# Patient Record
Sex: FEMALE | Race: WHITE | Employment: FULL TIME | URBAN - METROPOLITAN AREA
[De-identification: names, ages, dates, MRNs, and addresses within clinical notes are randomized per-mention and may not be internally consistent; named-entity substitution may affect disease eponyms.]

---

## 2017-03-08 ENCOUNTER — OFFICE VISIT (OUTPATIENT)
Dept: HEMATOLOGY | Age: 47
End: 2017-03-08

## 2017-03-08 VITALS
DIASTOLIC BLOOD PRESSURE: 69 MMHG | WEIGHT: 223 LBS | HEART RATE: 70 BPM | RESPIRATION RATE: 16 BRPM | OXYGEN SATURATION: 98 % | HEIGHT: 65 IN | BODY MASS INDEX: 37.15 KG/M2 | SYSTOLIC BLOOD PRESSURE: 127 MMHG | TEMPERATURE: 98.5 F

## 2017-03-08 DIAGNOSIS — R76.8 HCV ANTIBODY POSITIVE: Primary | ICD-10-CM

## 2017-03-08 PROBLEM — Z90.49 S/P CHOLECYSTECTOMY: Status: ACTIVE | Noted: 2017-03-08

## 2017-03-08 NOTE — PROGRESS NOTES
93 Tomás Rodriguez MD, CLYDE Manzo PA-C Jonas Pigg, MD, MD Jeannine Russ NP Kimble Reels, NP        [address], [address] [place]  22.     [phone]     FAX: [address], [address]     [facility], [address]     [phone]     FAX: [phone]         Patient Care Team:  Clari Acharya MD as PCP - Jamal Ambrocio MD (Gastroenterology)      Problem List  Date Reviewed: 3/8/2017          Codes Class Noted    HCV antibody positive ICD-10-CM: R76.8  ICD-9-CM: 795.79  3/8/2017        S/P cholecystectomy ICD-10-CM: Z90.49  ICD-9-CM: V45.79  3/8/2017        Calculus of kidney ICD-10-CM: N20.0  ICD-9-CM: 592.0  1/6/2016        S/P bariatric surgery ICD-10-CM: Z98.84  ICD-9-CM: V45.86  5/28/2014        Intestinal malabsorption ICD-10-CM: K90.9  ICD-9-CM: 579.9  5/14/2014        Mor[facility] ([facility] ICD-10-CM: E66.01  ICD-9-CM: 278.01  3/20/2014        GERD (gastroesophageal reflux disease) ICD-10-CM: K21.9  ICD-9-CM: 530.81  Unknown        Ulcerative colitis ([facility] ICD-10-CM: K51.90  ICD-9-CM: 556.9  Unknown        Hiatal hernia ICD-10-CM: K44.9  ICD-9-CM: 553.3  Unknown        Trauma ICD-10-CM: T14.90  ICD-9-CM: 959.9  Unknown    Overview Signed 1/20/2014 10:13 AM by Barb Cole MD     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 All medical records sent by the referring physicians were reviewed including imaging studies and pathology. The patient is a 51 y.o.   female who was noted to have an elevated serum ALT and tested positive for anti-HCV in 2014. HCV RNA was negative. She was retested for anti-HCV in 1/2017 and was borderline positive. HCV RNA was again undetectable. Risk factors for acquiring HCV are a former  that used IV drugs. She underwent gastric bypass surgery in 2014. A liver biopsy at that time demonstrated NAFL with no inflammation or fibrosis. ,. Since gastric bypass surgery she was lost 130 pounds. There was no history of acute incteric hepatitis at the time of these risk factors. CT scan of the liver was performed in 12/2016. The results of the imaging demonstrated a normal appearing liver. The patient has never received treatment for chronic HCV. The most recent laboratory studies indicate that the liver transaminases are normal, alkaline phosphatase is normal, tests of hepatic synthetic and metabolic function are normal, and the platelet count is normal.      The patient has no symptoms which can be attributed to the liver disorder. The patient has not experienced mild fatigue,     The patient completes all daily activities without any functional limitations. ALLERGIES  Allergies   Allergen Reactions    Aspirin Other (comments)     Pt stated that med \"agrivated ulcerative colitis\"    Ciprofloxacin Other (comments)     Colon bleeding    Codeine Other (comments)     Closes windpipe    Keflex [Cephalexin] Other (comments)    Motrin [Ibuprofen] Other (comments)    Sulfa (Sulfonamide Antibiotics) Other (comments)     Affects colitis    Trazodone Other (comments)     Migraine headaches    Vicodin [Hydrocodone-Acetaminophen] Other (comments)       MEDICATIONS  Current Outpatient Prescriptions   Medication Sig    CYCLOBENZAPRINE HCL (FLEXERIL PO) Take  by mouth.  dextrose-vitamin D3 (TRUEPLUS) 4-400 gram-unit chewable tablet Take 16 g by mouth as needed.  rOPINIRole (REQUIP) 0.5 mg tablet     ketorolac (TORADOL) 10 mg tablet Take  by mouth.     clopidogrel (PLAVIX) 75 mg tablet Take  by mouth daily.  amitriptyline (ELAVIL) 50 mg tablet Take 50 mg by mouth nightly.  montelukast (SINGULAIR) 10 mg tablet Take 10 mg by mouth daily.  levothyroxine (LEVOXYL) 88 mcg tablet Take  by mouth Daily (before breakfast).  mometasone-formoterol (DULERA) 200-5 mcg/actuation HFA inhaler Take 2 Puffs by inhalation two (2) times a day.  levalbuterol tartrate (XOPENEX HFA) 45 mcg/actuation inhaler Take  by inhalation.  omeprazole (PRILOSEC OTC) 20 mg tablet Take 20 mg by mouth daily.  ondansetron hcl (ZOFRAN) 4 mg tablet Take 4 mg by mouth every eight (8) hours as needed for Nausea.  nitrofurantoin, macrocrystal-monohydrate, (MACROBID) 100 mg capsule Take 100 mg by mouth two (2) times a day.  cyanocobalamin (VITAMIN B12) 500 mcg tablet Take 2 tablets by mouth Three (3) times a week.  multivitamins (CHEWABLE MULTI VITAMIN) chew Take 1 Tab by mouth two (2) times a day.  calcium citrate tab tablet Take 1 Tab by mouth daily. Take up to 6 chews every day     No current facility-administered medications for this visit. SYSTEM REVIEW NOT RELATED TO LIVER DISEASE OR REVIEWED ABOVE:  Constitution systems: Negative for fever, chills, weight gain, weight loss. Eyes: Negative for visual changes. ENT: Negative for sore throat, painful swallowing. Respiratory: Negative for cough, hemoptysis, SOB. Cardiology: Negative for chest pain, palpitations. GI:  Negative for constipation or diarrhea. : Negative for urinary frequency, dysuria, hematuria, nocturia. Skin: Negative for rash. Hematology: Negative for easy bruising, blood clots. Musculo-skelatal: Negative for back pain, muscle pain, weakness. Neurologic: Negative for headaches, dizziness, vertigo, memory problems not related to HE. Psychology: Negative for anxiety, depression. FAMILY HISTORY:  The father  of multiple myeloma. The mother  of MI. There is no family history of liver disease. SOCIAL HISTORY:  The patient is . The spouse has been tested for HCV and is positive. The patient has no children. The patient has never used tobacco products. The patient has never consumed significant amounts of alcohol. The patient currently works full time HR at [employer]. PHYSICAL EXAMINATION:  Visit Vitals    /69 (BP 1 Location: Left arm, BP Patient Position: Sitting)    Pulse 70    Temp 98.5 °F (36.9 °C) (Tympanic)    Resp 16    Ht 5' 5\" (1.651 m)    Wt 223 lb (101.2 kg)    LMP 03/08/2017    SpO2 98%    BMI 37.11 kg/m2     General: No acute distress. Eyes: Sclera anicteric. ENT: No oral lesions. Thyroid normal.  Nodes: No adenopathy. Skin: No spider angiomata. No jaundice. No palmar erythema. Respiratory: Lungs clear to auscultation. Cardiovascular: Regular heart rate. No murmurs. No JVD. Abdomen: Soft non-tender. Liver size normal to percussion/palpation. Spleen not palpable. No obvious ascites. Extremities: No edema. No muscle wasting. No gross arthritic changes. Neurologic: Alert and oriented. Cranial nerves grossly intact. No asterixis. LABORATORY STUDIES:  Recent liver function panel, CBC with platelet count and BMP are not available. These studies will be performed. SEROLOGIES:  Not available or performed. Testing was performed today. LIVER HISTOLOGY:  Not available or performed    ENDOSCOPIC PROCEDURES:  Not available or performed    RADIOLOGY:  Not available or performed    OTHER TESTING:  Not available or performed    ASSESSMENT AND PLAN:  Anti-HCV positive with undetectable HCV RNA x 2. The patient has been exposed to HCV but has had spontaneous resolution. The liver transaminases are normal.  Liver function is normal.  The platelet count is normal.      The patient has not previously been treated for HCV. The patient was directed to continue all current medications at the current dosages.   There are no contraindications for the patient to take any medications that are necessary for treatment of other medical issues. The patient was counseled regarding alcohol consumption. All of the above issues were discussed with the patient. All questions were answered. The patient expressed a clear understanding of the above. No follow-up at [address] is needed. I would be glad to see the patient back for follow-up at any time in the future if the clinical situation changes.     Rahel Molina MD  [facility] [address], [address] [place] [address], [address] [place] [address] Street - [address] [phone]

## 2017-03-08 NOTE — MR AVS SNAPSHOT
Visit Information Date & Time Provider Department Dept. Phone Encounter #  
 3/8/2017  9:00 AM Azucena Elias MD [address] [id number] Upcoming Health Maintenance Date Due HEMOGLOBIN A1C Q6M 1969 LIPID PANEL Q1 1969 FOOT EXAM Q1 9/14/1979 MICROALBUMIN Q1 9/14/1979 EYE EXAM RETINAL OR DILATED Q1 9/14/1979 DTaP/Tdap/Td series (1 - Tdap) 9/14/1990 PAP AKA CERVICAL CYTOLOGY 9/14/1990 INFLUENZA AGE 9 TO ADULT 8/1/2016 Allergies as of 3/8/2017  Review Complete On: 3/8/2017 By: Merissa Odell Severity Noted Reaction Type Reactions Aspirin  04/30/2014    Other (comments) Pt stated that med \"agrivated ulcerative colitis\" Ciprofloxacin  04/30/2014    Other (comments) Colon bleeding Codeine  01/06/2014    Other (comments) Closes windpipe Keflex [Cephalexin]  03/08/2017    Other (comments) Motrin [Ibuprofen]  03/08/2017    Other (comments) Sulfa (Sulfonamide Antibiotics)  04/23/2014    Other (comments) Affects colitis Trazodone  04/30/2014    Other (comments) Migraine headaches Vicodin [Hydrocodone-acetaminophen]  04/21/2014    Other (comments) Current Immunizations  Never Reviewed Name Date Pneumococcal Vaccine (Unspecified Type) 1/1/2012 Not reviewed this visit Vitals BP Pulse Temp Resp Height(growth percentile) Weight(growth percentile) 127/69 (BP 1 Location: Left arm, BP Patient Position: Sitting) 70 98.5 °F (36.9 °C) (Tympanic) 16 5' 5\" (1.651 m) 223 lb (101.2 kg) LMP SpO2 BMI OB Status Smoking Status 03/08/2017 98% 37.11 kg/m2 Having regular periods Never Smoker Vitals History BMI and BSA Data Body Mass Index Body Surface Area  
 37.11 kg/m 2 2.15 m 2 Preferred Pharmacy Pharmacy Name Phone [id number] [place] Your Updated Medication List  
  
 This list is accurate as of: 3/8/17 10:46 AM.  Always use your most recent med list.  
  
  
  
  
 amitriptyline 50 mg tablet Commonly known as:  ELAVIL Take 50 mg by mouth nightly. calcium citrate Tab tablet Take 1 Tab by mouth daily. Take up to 6 chews every day  
  
 cyanocobalamin 500 mcg tablet Commonly known as:  VITAMIN B12 Take 2 tablets by mouth Three (3) times a week. dextrose-vitamin D3 4-400 gram-unit chewable tablet Commonly known as:  Merlin Fam Take 16 g by mouth as needed. DULERA 200-5 mcg/actuation HFA inhaler Generic drug:  mometasone-formoterol Take 2 Puffs by inhalation two (2) times a day. FLEXERIL PO Take  by mouth.  
  
 ketorolac 10 mg tablet Commonly known as:  TORADOL Take  by mouth. LEVOXYL 88 mcg tablet Generic drug:  levothyroxine Take  by mouth Daily (before breakfast). multivitamins Chew Commonly known as:  CHEWABLE MULTI VITAMIN Take 1 Tab by mouth two (2) times a day. nitrofurantoin (macrocrystal-monohydrate) 100 mg capsule Commonly known as:  MACROBID Take 100 mg by mouth two (2) times a day. ondansetron hcl 4 mg tablet Commonly known as:  Shelly Lopez Take 4 mg by mouth every eight (8) hours as needed for Nausea. PLAVIX 75 mg Tab Generic drug:  clopidogrel Take  by mouth daily. PriLOSEC OTC 20 mg tablet Generic drug:  omeprazole Take 20 mg by mouth daily. rOPINIRole 0.5 mg tablet Commonly known as:  REQUIP  
  
 SINGULAIR 10 mg tablet Generic drug:  montelukast  
Take 10 mg by mouth daily. XOPENEX HFA 45 mcg/actuation inhaler Generic drug:  levalbuterol tartrate Take  by inhalation. Introducing [facility]! Dear Rasheed Madrigal: Thank you for requesting a [employer] account. Our records indicate that you already have an active [employer] account. You can access your account anytime at [email or web address] Did you know that you can access your hospital and ER discharge instructions at any time in [facility]? You can also review all of your test results from your hospital stay or ER visit. Additional Information If you have questions, please visit the Frequently Asked Questions section of the [employer] [facility] website at [email or web address] Remember, [facility] is NOT to be used for urgent needs. For medical emergencies, dial 911. Now available from your iPhone and Android! Please provide this summary of care documentation to your next provider. Your primary care clinician is listed as Daphne Mohs A. Andris Fells. If you have any questions after today's visit, please call [phone].

## 2018-08-16 ENCOUNTER — DOCUMENTATION ONLY (OUTPATIENT)
Dept: SURGERY | Age: 48
End: 2018-08-16

## 2018-08-16 NOTE — LETTER
[employer] [facility] 
 
 
Dear Patient, Your health is our main concern. It is important for your health to have follow-up lab work and to see you surgeon at 2 months, 4 months, 6 months, 9 months and annually after your weight loss surgery. Additionally, the Department of Bariatric Surgery at our hospital is a member of the [employer] [address] Surgical Quality Improvement Program ([facility] NSQIP). As a participant in this program, we gather information on the outcomes of our patients after surgery. Please call the office for a follow up appointment at [phone]. If you have moved out of the area or have changed surgeons please call us and let us know the name of your doctor. Your health and feedback are important to us. We greatly appreciate your response. Thank you, [facility]

## 2018-08-16 NOTE — PROGRESS NOTES
Per [facility] requirements;  E-mail and letter sent for follow up appointment. [facility] [place]  [facility]  [facility]      Dear Patient,  Your health is our main concern. It is important for your health to have follow-up lab work and to see you surgeon at 2 months, 4 months, 6 months, 9 months and annually after your weight loss surgery. Additionally, the Department of Bariatric Surgery at our hospital is a member of the [employer] [address] Surgical Quality Improvement Program ([facility] NSQIP). As a participant in this program, we gather information on the outcomes of our patients after surgery. Please call the office for a follow up appointment at [phone]. If you have moved out of the area or have changed surgeons please call us and let us know the name of your doctor. Your health and feedback are important to us. We greatly appreciate your response.        Thank you,  [facility] [address] [facility]

## 2019-03-20 ENCOUNTER — DOCUMENTATION ONLY (OUTPATIENT)
Dept: SURGERY | Age: 49
End: 2019-03-20

## 2019-03-20 NOTE — LETTER
[facility] [place] [facility] [place] [facility] 
 
 
Dear Patient, Your health is our main concern. It is important for your health to have follow-up lab work and to see you surgeon at 2 months, 4 months, 6 months, 9 months and annually after your weight loss surgery. Additionally, the Department of Bariatric Surgery at our hospital is a member of the [employer] [address] Surgical Quality Improvement Program ([facility] NSQIP). As a participant in this program, we gather information on the outcomes of our patients after surgery. Please call the office for a follow up appointment at [phone]. If you have moved out of the area or have changed surgeons please call us and let us know the name of your doctor. Your health and feedback are important to us. We greatly appreciate your response. Thank you, [facility] [place] [facility] [address],1

## 2019-03-20 NOTE — PROGRESS NOTES
Per [facility] requirements;  E-mail and letter sent for follow up appointment. [facility] [place]  [facility]  [facility]      Dear Patient,    Your health is our main concern. It is important for your health to have follow-up lab work and to see you surgeon at 2 months, 4 months, 6 months, 9 months and annually after your weight loss surgery. Additionally, the Department of Bariatric Surgery at our hospital is a member of the [employer] [address] Surgical Quality Improvement Program ([facility] NSQIP). As a participant in this program, we gather information on the outcomes of our patients after surgery. Please call the office for a follow up appointment at [phone]. If you have moved out of the area or have changed surgeons please call us and let us know the name of your doctor. Your health and feedback are important to us. We greatly appreciate your response.        Thank you,  [facility] [address] [facility]

## 2019-05-01 ENCOUNTER — HOSPITAL ENCOUNTER (OUTPATIENT)
Dept: LAB | Age: 49
Discharge: HOME | End: 2019-05-01
Attending: SPECIALIST
Payer: COMMERCIAL

## 2019-05-01 ENCOUNTER — HOSPITAL ENCOUNTER (OUTPATIENT)
Age: 49
Setting detail: OUTPATIENT SURGERY
Discharge: HOME | End: 2019-05-01
Attending: SPECIALIST | Admitting: SPECIALIST
Payer: COMMERCIAL

## 2019-05-01 ENCOUNTER — APPOINTMENT (OUTPATIENT)
Dept: GENERAL RADIOLOGY | Age: 49
End: 2019-05-01
Attending: SPECIALIST
Payer: COMMERCIAL

## 2019-05-01 ENCOUNTER — CLINICAL SUPPORT (OUTPATIENT)
Dept: SURGERY | Age: 49
End: 2019-05-01

## 2019-05-01 ENCOUNTER — OFFICE VISIT (OUTPATIENT)
Dept: SURGERY | Age: 49
End: 2019-05-01

## 2019-05-01 VITALS
BODY MASS INDEX: 39.4 KG/M2 | WEIGHT: 236.5 LBS | HEART RATE: 76 BPM | DIASTOLIC BLOOD PRESSURE: 71 MMHG | TEMPERATURE: 98.6 F | SYSTOLIC BLOOD PRESSURE: 128 MMHG | RESPIRATION RATE: 16 BRPM | OXYGEN SATURATION: 100 % | HEIGHT: 65 IN

## 2019-05-01 VITALS
TEMPERATURE: 96.3 F | HEIGHT: 65 IN | DIASTOLIC BLOOD PRESSURE: 86 MMHG | OXYGEN SATURATION: 97 % | BODY MASS INDEX: 39.14 KG/M2 | SYSTOLIC BLOOD PRESSURE: 147 MMHG | WEIGHT: 234.9 LBS | HEART RATE: 73 BPM

## 2019-05-01 VITALS — BODY MASS INDEX: 39.4 KG/M2 | WEIGHT: 236.5 LBS | HEIGHT: 65 IN

## 2019-05-01 DIAGNOSIS — K90.9 INTESTINAL MALABSORPTION, UNSPECIFIED TYPE: ICD-10-CM

## 2019-05-01 DIAGNOSIS — Z90.49 S/P CHOLECYSTECTOMY: ICD-10-CM

## 2019-05-01 DIAGNOSIS — R13.10 DYSPHAGIA, UNSPECIFIED TYPE: Primary | ICD-10-CM

## 2019-05-01 DIAGNOSIS — R10.9 ABDOMINAL PAIN, UNSPECIFIED ABDOMINAL LOCATION: Primary | ICD-10-CM

## 2019-05-01 DIAGNOSIS — K44.9 HIATAL HERNIA: ICD-10-CM

## 2019-05-01 DIAGNOSIS — Z98.84 S/P BARIATRIC SURGERY: ICD-10-CM

## 2019-05-01 DIAGNOSIS — K51.919 ULCERATIVE COLITIS WITH COMPLICATION, UNSPECIFIED LOCATION (HCC): ICD-10-CM

## 2019-05-01 DIAGNOSIS — E66.9 OBESITY (BMI 35.0-39.9 WITHOUT COMORBIDITY): Primary | ICD-10-CM

## 2019-05-01 DIAGNOSIS — E66.01 MORBID OBESITY (HCC): ICD-10-CM

## 2019-05-01 LAB
FERRITIN SERPL-MCNC: 9 NG/ML (ref 8–388)
FOLATE SERPL-MCNC: >20 NG/ML (ref 3.1–17.5)
IRON SERPL-MCNC: 85 UG/DL (ref 50–175)
VIT B12 SERPL-MCNC: 780 PG/ML (ref 211–911)

## 2019-05-01 PROCEDURE — 77030040361 HC SLV COMPR DVT MDII -B: Performed by: SPECIALIST

## 2019-05-01 PROCEDURE — 82607 VITAMIN B-12: CPT

## 2019-05-01 PROCEDURE — 74240 X-RAY XM UPR GI TRC 1CNTRST: CPT

## 2019-05-01 PROCEDURE — 82746 ASSAY OF FOLIC ACID SERUM: CPT

## 2019-05-01 PROCEDURE — 84425 ASSAY OF VITAMIN B-1: CPT

## 2019-05-01 PROCEDURE — 36415 COLL VENOUS BLD VENIPUNCTURE: CPT

## 2019-05-01 PROCEDURE — 74011000255 HC RX REV CODE- 255: Performed by: SPECIALIST

## 2019-05-01 PROCEDURE — 82728 ASSAY OF FERRITIN: CPT

## 2019-05-01 PROCEDURE — 83540 ASSAY OF IRON: CPT

## 2019-05-01 PROCEDURE — 76040000019: Performed by: SPECIALIST

## 2019-05-01 RX ORDER — OMEPRAZOLE 40 MG/1
40 CAPSULE, DELAYED RELEASE ORAL DAILY
COMMUNITY
Start: 2019-04-22

## 2019-05-01 RX ORDER — ERGOCALCIFEROL 1.25 MG/1
50000 CAPSULE ORAL 2 TIMES WEEKLY
Qty: 48 CAP | Refills: 0 | Status: SHIPPED | OUTPATIENT
Start: 2019-05-03

## 2019-05-01 RX ORDER — FUROSEMIDE 40 MG/1
TABLET ORAL
COMMUNITY
Start: 2019-01-14

## 2019-05-01 NOTE — PROCEDURES
5 years post gastric bypass with poor F/U. Presented to office earlier today with C/O abd pain and dysphagia. Had EGD summer 2017 with Dr. Martine Mcclendon (report scanned under media tab). Pt presents with possible stricture formation.   See dictation for results and plan

## 2019-05-01 NOTE — PROGRESS NOTES
5 years follow-up Subjective:  
 
Ronaldo Witt  is a 51 y.o. female who presents for follow-up about 5 years following laparoscopic gastric bypass surgery. She has lost a total of 49 pounds since surgery. Body mass index is 39.36 kg/m². . EBWL is (32%). The patient presents today to assess their progress toward their goal of weight loss and to address any issues that may be present. Today the patient and I have reviewed their diet and how appropriate their food choices are. The following issues have been identified  - pt here at urging of her PCP for various issues to include anemia and abd pain and daily vomiting. She states she had an EGD with Dr. Alyssa De Jesus 2 years ago that showed 2 ulcers in a patient with no known risk factors aside from stress of a recent divorce and death of a mother. The patient does not have her gallbladder. Pain assessment - 0/10 - currently [facility] Surgery related complication: NA She reports vomiting at least once a day with complex PO intake and states she relys on simple carbs and liquids for her nutrition and denies diarrhea and difficulty breathing. The patient's exercise level: not active. Changes in her medical history and medications have been reviewed. Patient Active Problem List  
Diagnosis Code  GERD (gastroesophageal reflux disease) K21.9  Ulcerative colitis ([address] K51.90  
 Hiatal hernia K44.9  Trauma T14.90XA  Stroke ([address] I63.9  Morbid obesity (HCC) E66.01  
 Intestinal malabsorption K90.9  S/P bariatric surgery Z98.84  
 Calculus of kidney N20.0  HCV antibody positive R76.8  S/P cholecystectomy Z90.49 Past Medical History:  
Diagnosis Date  Arthritis  Asthma  Diabetes ([address] borderline  GERD (gastroesophageal reflux disease)  Hiatal hernia  High triglycerides  Hypercholesterolemia  Hypertension 2009  Morbid obesity ([address]  Nausea & vomiting  PUD (peptic ulcer disease) h/o  
 Sleep apnea   
 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  
Procedure Laterality Date  ABDOMEN SURGERY PROC UNLISTED    
 D &C  
 HX CHOLECYSTECTOMY  1992 [place]  LAP GASTRIC BYPASS/FATOU-EN-Y Current Outpatient Medications Medication Sig Dispense Refill  furosemide (LASIX) 40 mg tablet 2X/WEEK AS NEEDED FOR EDEMA.  rOPINIRole (REQUIP) 0.5 mg tablet 0.5 mg.    
 cyanocobalamin (VITAMIN B12) 500 mcg tablet Take 2 tablets by mouth Three (3) times a week. 90 tablet 0  
 multivitamins (CHEWABLE MULTI VITAMIN) chew Take 1 Tab by mouth two (2) times a day. 180 Tab 0  clopidogrel (PLAVIX) 75 mg tablet Take  by mouth daily.  amitriptyline (ELAVIL) 50 mg tablet Take 50 mg by mouth nightly.  montelukast (SINGULAIR) 10 mg tablet Take 10 mg by mouth daily.  levothyroxine (LEVOXYL) 88 mcg tablet Take  by mouth Daily (before breakfast).  mometasone-formoterol (DULERA) 200-5 mcg/actuation HFA inhaler Take 2 Puffs by inhalation two (2) times a day.  levalbuterol tartrate (XOPENEX HFA) 45 mcg/actuation inhaler Take  by inhalation.  omeprazole (PRILOSEC) 40 mg capsule Review of Symptoms:  
 
General - No history or complaints of unexpected fever or chills Head/Neck - No history or complaints of headache or dizziness Cardiac - No history or complaints of chest pain, palpitations, or shortness of breath Pulmonary - No history or complaints of shortness of breath or productive cough Gastrointestinal - as noted above Genitourinary - No history or complaints of hematuria/dysuria or renal lithiasis Musculoskeletal - No history or complaints of joint  muscular weakness Hematologic - No history of any bleeding episodes Neurologic - No history or complaints of  migraine headaches or neurologic symptoms Objective:  
 
Visit Vitals /71 (BP 1 Location: Left arm, BP Patient Position: Sitting) Pulse 76 Temp 98.6 °F (37 °C) Resp 16 Ht 5' 5\" (1.651 m) Wt 107.3 kg (236 lb 8 oz) SpO2 100% BMI 39.36 kg/m² Physical Exam: 
 
General:  alert, cooperative, no distress, appears stated age Lungs:   clear to auscultation bilaterally Heart:  Regular rate and rhythm Abdomen:   abdomen is soft without significant tenderness, masses, organomegaly or guarding; Incisions: healing well, no significant drainage Labs:  
 
Select vitamins ordered today Assessment:  
 
1. History of Morbid obesity, status post  laparoscopic gastric bypass surgery. The patient has had very poor F/U (not seen since her 2 month visit). She states she has regained 59 lbs since her lowest weight of 177 lbs. December PCP labs were reviewed via Care Everywhere - Hgb 13+, HgbA1C 5.0, VIt D 31.5 I have explained to her that she does not meet the criteria for anemia. Her PCP told her to take OTC Vit D - I have discussed the need for prescription strength Vit D to correct her issues We will get Dr. Rachelle Anand EGD reports from 2 years ago. I have discussed with her the concept of stricture post ulcer formation. She has no ulcer risks factors aside from stress related to a recent divorce and death in the family. Plan for UGI later today to evaluate nature of gastric bypass - discussed possible need for future EGD with dilation Plan: 1. Remember to measure portions, continue low carbohydrate diet 2. Reviewed labs and appropriate changes made to vitamin regimen 3. Remember vitamin supplements. 4. Exercise regimen appears inadequate. 5. Attend support group 6. Follow-up later today. 7. The patient understands the plan of action 8. Total time spent with the patient 30 minutes. I have reviewed the patients history and clinical findings with my physician extender in person. The patient has had all of their questions answered today including both exercise and dietary issues. I have reviwed any relevant  data and agree with the current plan of action. Ahmet Bradford M.D. Addendum: Dr Lexii Riddle EGD report does not refer to any ulcer on that prior EGD.

## 2019-05-04 NOTE — OP NOTES
[facility] [place]  OPERATIVE REPORT    Name:  Chato Mccarthy  MR#:   [id number]  :  1970  ACCOUNT #:  [de-identified]  DATE OF SERVICE:  2019    PREOPERATIVE DIAGNOSIS:  The patient is five years postoperative from gastric bypass procedure with chronic dysphagia. POSTOPERATIVE DIAGNOSIS:  The patient is five years postoperative from gastric bypass procedure with chronic dysphagia. PROCEDURE PERFORMED:  Upper gastrointestinal study with barium. SURGEON:  Sidney Mix MD    ASSISTANT:  None. ANESTHESIA:  None. COMPLICATIONS:  None. SPECIMENS REMOVED:  None. IMPLANTS:  None. ESTIMATED BLOOD LOSS:  None. STATEMENT OF MEDICAL NECESSITY:  The patient is a 50-year-old female who had a gastric bypass five years ago. She has had dysphagia as of late and had an endoscopy with Dr. Liliya Miles in 2017, which showed only a small hiatal hernia and some mild esophagitis. The patient was told at that time period that she had an ulcer but we have obtained the endoscopy report and it does not show that to be the case. She had these symptoms as of late. She is here to have upper GI study to assess her gastric pouch and its emptying. PROCEDURE:  The patient was brought to the fluoroscopy unit where she was given thin barium. On swallowing barium, she was noted to have normal peristalsis of her esophagus. She had prompt fill in the distal esophagus with tapering into and through the gastroesophageal junction. Contrast then filled the gastric pouch which was normal in caliber and dimension. There was very slow emptying of the pouch into the Sandoval limb, which was normal in caliber and dimension. We did have her walk around for about 10 minutes.   We brought her back in the room and contrast very slowly passed into the Sandoval limb, but once again, it was much slower than normal.    At this juncture, we are going to go ahead and obtain a CT scan of the abdomen to rule out any internal hernia, although initially on this upper GI study, her Sandoval limb is located on the correct side, which would be left of midline which would likely almost rule it out completely, but we will await on the CT scan to assist us with our final assessment prior to offering her repeat endoscopy.       Tyrone Haley MD      [id number]  D:  05/03/2019 10:02  T:  05/03/2019 19:41  JOB #:  [id number]

## 2019-05-06 LAB — VIT B1 BLD-SCNC: 113.5 NMOL/L (ref 66.5–200)

## 2019-05-11 ENCOUNTER — HOSPITAL ENCOUNTER (OUTPATIENT)
Dept: CT IMAGING | Age: 49
Discharge: HOME | End: 2019-05-11
Attending: SPECIALIST
Payer: COMMERCIAL

## 2019-05-11 DIAGNOSIS — R10.9 ABDOMINAL PAIN, UNSPECIFIED ABDOMINAL LOCATION: ICD-10-CM

## 2019-05-11 PROCEDURE — 74011000255 HC RX REV CODE- 255: Performed by: SPECIALIST

## 2019-05-11 PROCEDURE — 74177 CT ABD & PELVIS W/CONTRAST: CPT

## 2019-05-11 PROCEDURE — 74011636320 HC RX REV CODE- 636/320: Performed by: SPECIALIST

## 2019-05-11 RX ORDER — BARIUM SULFATE 20 MG/ML
900 SUSPENSION ORAL
Status: COMPLETED | OUTPATIENT
Start: 2019-05-11 | End: 2019-05-11

## 2019-05-11 RX ADMIN — BARIUM SULFATE 900 ML: 20 SUSPENSION ORAL at 10:01

## 2019-05-11 RX ADMIN — IOPAMIDOL 100 ML: 612 INJECTION, SOLUTION INTRAVENOUS at 10:01

## 2019-07-01 ENCOUNTER — DOCUMENTATION ONLY (OUTPATIENT)
Dept: SURGERY | Age: 49
End: 2019-07-01

## 2019-07-01 ENCOUNTER — TELEPHONE (OUTPATIENT)
Dept: SURGERY | Age: 49
End: 2019-07-01

## 2019-07-01 ENCOUNTER — CLINICAL SUPPORT (OUTPATIENT)
Dept: SURGERY | Age: 49
End: 2019-07-01

## 2019-07-01 VITALS — BODY MASS INDEX: 38.15 KG/M2 | HEIGHT: 65 IN | WEIGHT: 229 LBS

## 2019-07-01 DIAGNOSIS — Z98.84 S/P BARIATRIC SURGERY: Primary | ICD-10-CM

## 2019-07-01 NOTE — PROGRESS NOTES
Jacki Ro  is a 51 y.o. female who presents for follow-up about 5 years following laparoscopic gastric bypass surgery. She has lost a total of 54 pounds since surgery. Body mass index is 38.11 kg/m². . EBWL is (36%). The patient presents today to assess their progress toward their goal of weight loss and to address any issues that may be present. Visit Vitals  Ht 5' 5\" (1.651 m)   Wt 103.9 kg (229 lb)   BMI 38.11 kg/m²       Patient presents due to unintentional weight loss and worsening GERD like/alt GI symptoms. She has been working to [employer] and intake patterns. We reviewed post operative diet progression and portion guide with plate method for menu planning. Reinforced importance of adequate protein and controlling carbohydrate intake. Patient reports intermittent reactive hypoglycemia, reviewed symptoms cause and tips to prevent and treat. Reviewed usual intake choices and assisted with alternative intake regimen, tips to increase protein and options to help promote weight loss. Diet History:  Typical intake is as follows:  Breakfast: [employer] Lean Shake with coffee (5-6 am)  Lunch: (1 pm). Salad greens with shredded chicken (2.5 - 3 oz), hard boiled egg with shredded cheese with olive garden oil and vinegar OR [employer] lean protein bar   Snack:  Patient eliminated chocolate -  She is now doing 1 oz - 1.5 oz of peanuts OR CarbMaster yougrt (reports that she has been having better time with hypoglycemia since reducing breads/starches)    Dinner: 2 oz baked fish OR cheese with steamed vegetables OR spinach   Fluids: 50 oz water, 16 oz propel, 16 oz unsweet tea, coffee    Exercise:  Do you currently have an exercise routine? yes  What kind of exercise do you do? Walking . For how long? 30-45 minutes For how often? 4-6 days per week  - She is doing yard work and pool exercises as well. Goals:   1.  Work to increase to 4-5 small meals per day, with protein supplements and planned protein snacks included Recommend following plate method for meal planning - focusing on lean protein, non-starchy vegetables, and measured amounts of starch. - Goal of  g protein and 50-60 g carbohydrate per day. Focus on consuming protein foods first   -Avoid going > 4 hours without intake to avoid hypoglycemia and avoid eating carbohydrate foods on their own    2. Increase non caloric fluid to 64 oz per day. Eliminate added sugar and continue to separate food and fluid     3. Continue with activity, add diversity with exercise by changing intensity, length of time or adding in resistance training 2-3 days per week for 15-20 minutes.     F/u: PRN

## 2019-07-10 ENCOUNTER — HOSPITAL ENCOUNTER (OUTPATIENT)
Dept: PREADMISSION TESTING | Age: 49
Discharge: HOME | End: 2019-07-10
Payer: COMMERCIAL

## 2019-07-10 DIAGNOSIS — Z01.818 PREOPERATIVE EXAMINATION, UNSPECIFIED: ICD-10-CM

## 2019-07-10 LAB
ALBUMIN SERPL-MCNC: 3.7 G/DL (ref 3.4–5)
ALBUMIN/GLOB SERPL: 1.2 {RATIO} (ref 0.8–1.7)
ALP SERPL-CCNC: 65 U/L (ref 45–117)
ALT SERPL-CCNC: 21 U/L (ref 13–56)
ANION GAP SERPL CALC-SCNC: 6 MMOL/L (ref 3–18)
AST SERPL-CCNC: 16 U/L (ref 15–37)
ATRIAL RATE: 74 BPM
BASOPHILS # BLD: 0 K/UL (ref 0–0.1)
BASOPHILS NFR BLD: 0 % (ref 0–2)
BILIRUB SERPL-MCNC: 0.3 MG/DL (ref 0.2–1)
BUN SERPL-MCNC: 21 MG/DL (ref 7–18)
BUN/CREAT SERPL: 21 (ref 12–20)
CALCIUM SERPL-MCNC: 8.9 MG/DL (ref 8.5–10.1)
CALCULATED P AXIS, ECG09: 66 DEGREES
CALCULATED R AXIS, ECG10: -21 DEGREES
CALCULATED T AXIS, ECG11: 0 DEGREES
CHLORIDE SERPL-SCNC: 106 MMOL/L (ref 100–108)
CO2 SERPL-SCNC: 28 MMOL/L (ref 21–32)
CREAT SERPL-MCNC: 1 MG/DL (ref 0.6–1.3)
DIAGNOSIS, 93000: NORMAL
DIFFERENTIAL METHOD BLD: NORMAL
EOSINOPHIL # BLD: 0.1 K/UL (ref 0–0.4)
EOSINOPHIL NFR BLD: 2 % (ref 0–5)
ERYTHROCYTE [DISTWIDTH] IN BLOOD BY AUTOMATED COUNT: 14.2 % (ref 11.6–14.5)
GLOBULIN SER CALC-MCNC: 3 G/DL (ref 2–4)
GLUCOSE SERPL-MCNC: 64 MG/DL (ref 74–99)
HCG SERPL QL: NEGATIVE
HCT VFR BLD AUTO: 37.1 % (ref 35–45)
HGB BLD-MCNC: 12 G/DL (ref 12–16)
LYMPHOCYTES # BLD: 2.1 K/UL (ref 0.9–3.6)
LYMPHOCYTES NFR BLD: 24 % (ref 21–52)
MCH RBC QN AUTO: 26.5 PG (ref 24–34)
MCHC RBC AUTO-ENTMCNC: 32.3 G/DL (ref 31–37)
MCV RBC AUTO: 81.9 FL (ref 74–97)
MONOCYTES # BLD: 0.7 K/UL (ref 0.05–1.2)
MONOCYTES NFR BLD: 8 % (ref 3–10)
NEUTS SEG # BLD: 5.7 K/UL (ref 1.8–8)
NEUTS SEG NFR BLD: 66 % (ref 40–73)
P-R INTERVAL, ECG05: 154 MS
PLATELET # BLD AUTO: 285 K/UL (ref 135–420)
PMV BLD AUTO: 10 FL (ref 9.2–11.8)
POTASSIUM SERPL-SCNC: 4.1 MMOL/L (ref 3.5–5.5)
PROT SERPL-MCNC: 6.7 G/DL (ref 6.4–8.2)
Q-T INTERVAL, ECG07: 402 MS
QRS DURATION, ECG06: 102 MS
QTC CALCULATION (BEZET), ECG08: 446 MS
RBC # BLD AUTO: 4.53 M/UL (ref 4.2–5.3)
SODIUM SERPL-SCNC: 140 MMOL/L (ref 136–145)
VENTRICULAR RATE, ECG03: 74 BPM
WBC # BLD AUTO: 8.8 K/UL (ref 4.6–13.2)

## 2019-07-10 PROCEDURE — 36415 COLL VENOUS BLD VENIPUNCTURE: CPT

## 2019-07-10 PROCEDURE — 80053 COMPREHEN METABOLIC PANEL: CPT

## 2019-07-10 PROCEDURE — 85025 COMPLETE CBC W/AUTO DIFF WBC: CPT

## 2019-07-10 PROCEDURE — 84703 CHORIONIC GONADOTROPIN ASSAY: CPT

## 2019-07-10 PROCEDURE — 93005 ELECTROCARDIOGRAM TRACING: CPT

## 2019-07-15 NOTE — H&P
EGD - History and Physical    Subjective: The patient is a 51 y.o. obese female who is 5 years post gastric bypass with poor F/U. She recently presented to the office with C/O abd pain and dysphagia. An UGI was essentially normal as well as a subsequent CT scan. She understands the need to proceed with an EGD to evaluate for possible stricture secondary to ulcer formation. Patient Active Problem List    Diagnosis Date Noted    Calculus of kidney 01/06/2016     Priority: 1 - One    Dysphagia     HCV antibody positive 03/08/2017    S/P cholecystectomy 03/08/2017    S/P bariatric surgery 05/28/2014    Intestinal malabsorption 05/14/2014    Morbid obesity ([address] 03/20/2014    GERD (gastroesophageal reflux disease)     Ul[address]ative [address] [address]     Hiatal hernia     Trauma     Stroke [facility])       Past Surgical History:   Procedure Laterality Date    HX CHOLECYSTECTOMY  1992    HX DILATION AND CURETTAGE      HX GASTRIC BYPASS  2014    HX TONSILLECTOMY  1976    LAP GASTRIC BYPASS/FATOU-EN-Y        Social History     Tobacco Use    Smoking status: Never Smoker    Smokeless tobacco: Never Used   Substance Use Topics    Alcohol use: No      Family History   Problem Relation Age of Onset    Hypertension Mother     Heart Attack Mother     Stroke Mother     Cancer Father     Diabetes Father     Hypertension Father     Diabetes Brother     Hypertension Brother     Heart Disease Maternal Grandmother       Current Outpatient Medications   Medication Sig Dispense Refill    levothyroxine (SYNTHROID) 100 mcg tablet Take 100 mcg by mouth Daily (before breakfast).  furosemide (LASIX) 40 mg tablet 2X/WEEK AS NEEDED FOR EDEMA.  omeprazole (PRILOSEC) 40 mg capsule Take 40 mg by mouth daily.  ergocalciferol (ERGOCALCIFEROL) 50,000 unit capsule Take 1 Cap by mouth two (2) times a week. 48 Cap 0    rOPINIRole (REQUIP) 0.5 mg tablet 0.5 mg nightly.  Indications: Extreme Discomfort in Calves when Sitting or Lying Down      cyanocobalamin (VITAMIN B12) 500 mcg tablet Take 2 tablets by mouth Three (3) times a week. 90 tablet 0    multivitamins (CHEWABLE MULTI VITAMIN) chew Take 1 Tab by mouth two (2) times a day. 180 Tab 0    clopidogrel (PLAVIX) 75 mg tablet Take  by mouth daily.  amitriptyline (ELAVIL) 50 mg tablet Take 50 mg by mouth nightly.  montelukast (SINGULAIR) 10 mg tablet Take 10 mg by mouth nightly.  mometasone-formoterol (DULERA) 200-5 mcg/actuation HFA inhaler Take 2 Puffs by inhalation two (2) times a day.  levalbuterol tartrate (XOPENEX HFA) 45 mcg/actuation inhaler Take 2 Puffs by inhalation every four (4) hours as needed.        Allergies   Allergen Reactions    Codeine Other (comments)     Closes windpipe    Aspirin Other (comments)     Pt stated that med \"agrivated ulcerative colitis\"    Ciprofloxacin Other (comments)     Colon bleeding    Keflex [Cephalexin] Other (comments)    Motrin [Ibuprofen] Other (comments)    Sulfa (Sulfonamide Antibiotics) Other (comments)     Affects colitis    Trazodone Other (comments)     Migraine headaches    Vicodin [Hydrocodone-Acetaminophen] Other (comments)          Review of Systems:        General - No history or complaints of unexpected fever, chills, or weight loss  Head/Neck - No history or complaints of headache, diplopia, dysphagia, hearing loss  Cardiac - No history or complaints of chest pain, palpitations, murmur, or shortness of breath  Pulmonary - No history or complaints of shortness of breath, productive cough, hemoptysis  Gastrointestinal - No history or complaints of reflux,  abdominal pain, obstipation/constipation, blood per rectum  Genitourinary - No history or complaints of hematuria/dysuria, stress urinary incontinence symptoms, or renal lithiasis  Musculoskeletal - No history or complaints of joint pain or muscular weakness  Hematologic - No history or complaints of bleeding disorders, blood transfusions, sickle cell anemia  Neurologic - No history or complaints of  migraine headaches, seizure activity, syncopal episodes, TIA or stroke  Integumentary - No history or complaints of rashes, abnormal nevi, skin cancer  Gynecological - unremarkable    Objective:     Visit Vitals  LMP 07/08/2019 (Approximate)         Physical Examination: General appearance - alert, well appearing, and in no distress and oriented to person, place, and time  Mental status - alert, oriented to person, place, and time, normal mood, behavior, speech, dress, motor activity, and thought processes  Eyes - pupils equal and reactive, extraocular eye movements intact, sclera anicteric, left eye normal, right eye normal  Ears - right ear normal, left ear normal  Nose - normal and patent, no erythema, discharge or polyps  Mouth - mucous membranes moist, pharynx normal without lesions  Neck - supple, no significant adenopathy  Lymphatics - no palpable lymphadenopathy, no hepatosplenomegaly  Chest - clear to auscultation, no wheezes, rales or rhonchi, symmetric air entry  Heart - normal rate, regular rhythm, normal S1, S2, no murmurs, rubs, clicks or gallops  Abdomen - soft, nontender, nondistended, no masses or organomegaly  Back exam - full range of motion, no tenderness, palpable spasm or pain on motion  Neurological - alert, oriented, normal speech, no focal findings or movement disorder noted  Musculoskeletal - no joint tenderness, deformity or swelling  Extremities - peripheral pulses normal, no pedal edema, no clubbing or cyanosis  Skin - normal coloration and turgor, no rashes, no suspicious skin lesions noted    Labs / Preoperative Evaluations:     Recent Results (from the past 1008 hour(s))   EKG, 12 LEAD, INITIAL    Collection Time: 07/10/19  1:53 PM   Result Value Ref Range    Ventricular Rate 74 BPM    Atrial Rate 74 BPM    P-R Interval 154 ms    QRS Duration 102 ms    Q-T Interval 402 ms    QTC Calculation (Bezet) 446 ms Calculated P Axis 66 degrees    Calculated R Axis -21 degrees    Calculated T Axis 0 degrees    Diagnosis       Normal sinus rhythm  Cannot rule out Anterior infarct , age undetermined  Abnormal ECG  Confirmed by Rashad Zhou MD, Sandra ([id number]) on 7/10/2019 3:18:47 PM     CBC WITH AUTOMATED DIFF    Collection Time: 07/10/19  2:03 PM   Result Value Ref Range    WBC 8.8 4.6 - 13.2 K/uL    RBC 4.53 4.20 - 5.30 M/uL    HGB 12.0 12.0 - 16.0 g/dL    HCT 37.1 35.0 - 45.0 %    MCV 81.9 74.0 - 97.0 FL    MCH 26.5 24.0 - 34.0 PG    MCHC 32.3 31.0 - 37.0 g/dL    RDW 14.2 11.6 - 14.5 %    PLATELET 708 853 - 221 K/uL    MPV 10.0 9.2 - 11.8 FL    NEUTROPHILS 66 40 - 73 %    LYMPHOCYTES 24 21 - 52 %    MONOCYTES 8 3 - 10 %    EOSINOPHILS 2 0 - 5 %    BASOPHILS 0 0 - 2 %    ABS. NEUTROPHILS 5.7 1.8 - 8.0 K/UL    ABS. LYMPHOCYTES 2.1 0.9 - 3.6 K/UL    ABS. MONOCYTES 0.7 0.05 - 1.2 K/UL    ABS. EOSINOPHILS 0.1 0.0 - 0.4 K/UL    ABS. BASOPHILS 0.0 0.0 - 0.1 K/UL    DF AUTOMATED     HCG QL SERUM    Collection Time: 07/10/19  2:03 PM   Result Value Ref Range    HCG, Ql. NEGATIVE  NEG     METABOLIC PANEL, COMPREHENSIVE    Collection Time: 07/10/19  2:03 PM   Result Value Ref Range    Sodium 140 136 - 145 mmol/L    Potassium 4.1 3.5 - 5.5 mmol/L    Chloride 106 100 - 108 mmol/L    CO2 28 21 - 32 mmol/L    Anion gap 6 3.0 - 18 mmol/L    Glucose 64 (L) 74 - 99 mg/dL    BUN 21 (H) 7.0 - 18 MG/DL    Creatinine 1.00 0.6 - 1.3 MG/DL    BUN/Creatinine ratio 21 (H) 12 - 20      GFR est AA >60 >60 ml/min/1.73m2    GFR est non-AA 59 (L) >60 ml/min/1.73m2    Calcium 8.9 8.5 - 10.1 MG/DL    Bilirubin, total 0.3 0.2 - 1.0 MG/DL    ALT (SGPT) 21 13 - 56 U/L    AST (SGOT) 16 15 - 37 U/L    Alk.  phosphatase 65 45 - 117 U/L    Protein, total 6.7 6.4 - 8.2 g/dL    Albumin 3.7 3.4 - 5.0 g/dL    Globulin 3.0 2.0 - 4.0 g/dL    A-G Ratio 1.2 0.8 - 1.7         Assessment:     5 years post gastric bypass with C/O abd pain and dysphagia with recent normal UGI and CT scan    Plan:     EGD    Plan for EGD. She understands the risks, benefits and alternatives of the EGD. She understands the risk include but are not limited to; death, DVT/PE, damage to surrounding structures, perforation of the GI tract, equipment malfunction, bleeding, and infection. She understands all of the above and wishes to proceed with EGD.         Signed By: GABRIELLA Khan     July 15, 2019

## 2019-07-16 ENCOUNTER — HOSPITAL ENCOUNTER (OUTPATIENT)
Age: 49
Setting detail: OUTPATIENT SURGERY
Discharge: HOME | End: 2019-07-16
Attending: SPECIALIST | Admitting: SPECIALIST
Payer: COMMERCIAL

## 2019-07-16 ENCOUNTER — ANESTHESIA (OUTPATIENT)
Dept: ENDOSCOPY | Age: 49
End: 2019-07-16
Payer: COMMERCIAL

## 2019-07-16 ENCOUNTER — ANESTHESIA EVENT (OUTPATIENT)
Dept: ENDOSCOPY | Age: 49
End: 2019-07-16
Payer: COMMERCIAL

## 2019-07-16 VITALS
HEIGHT: 65 IN | WEIGHT: 230.38 LBS | TEMPERATURE: 98.2 F | DIASTOLIC BLOOD PRESSURE: 68 MMHG | BODY MASS INDEX: 38.38 KG/M2 | OXYGEN SATURATION: 100 % | HEART RATE: 76 BPM | SYSTOLIC BLOOD PRESSURE: 115 MMHG | RESPIRATION RATE: 12 BRPM

## 2019-07-16 LAB
GLUCOSE BLD STRIP.AUTO-MCNC: 197 MG/DL (ref 70–110)
GLUCOSE BLD STRIP.AUTO-MCNC: 82 MG/DL (ref 70–110)
HCG UR QL: NEGATIVE

## 2019-07-16 PROCEDURE — 74011000250 HC RX REV CODE- 250

## 2019-07-16 PROCEDURE — 81025 URINE PREGNANCY TEST: CPT

## 2019-07-16 PROCEDURE — 82962 GLUCOSE BLOOD TEST: CPT

## 2019-07-16 PROCEDURE — 77030020263 HC SOL INJ SOD CL0.9% LFCR 1000ML: Performed by: SPECIALIST

## 2019-07-16 PROCEDURE — 88305 TISSUE EXAM BY PATHOLOGIST: CPT

## 2019-07-16 PROCEDURE — 76040000019: Performed by: SPECIALIST

## 2019-07-16 PROCEDURE — 74011250636 HC RX REV CODE- 250/636: Performed by: SPECIALIST

## 2019-07-16 PROCEDURE — 76060000031 HC ANESTHESIA FIRST 0.5 HR: Performed by: SPECIALIST

## 2019-07-16 PROCEDURE — 77030021593 HC FCPS BIOP ENDOSC BSC -A: Performed by: SPECIALIST

## 2019-07-16 RX ORDER — HYDROMORPHONE HYDROCHLORIDE 2 MG/ML
0.5 INJECTION, SOLUTION INTRAMUSCULAR; INTRAVENOUS; SUBCUTANEOUS
Status: CANCELLED | OUTPATIENT
Start: 2019-07-16

## 2019-07-16 RX ORDER — NALOXONE HYDROCHLORIDE 0.4 MG/ML
0.1 INJECTION, SOLUTION INTRAMUSCULAR; INTRAVENOUS; SUBCUTANEOUS
Status: CANCELLED | OUTPATIENT
Start: 2019-07-16

## 2019-07-16 RX ORDER — ONDANSETRON 2 MG/ML
4 INJECTION INTRAMUSCULAR; INTRAVENOUS ONCE
Status: CANCELLED | OUTPATIENT
Start: 2019-07-16 | End: 2019-07-16

## 2019-07-16 RX ORDER — OXYCODONE AND ACETAMINOPHEN 5; 325 MG/1; MG/1
1 TABLET ORAL AS NEEDED
Status: CANCELLED | OUTPATIENT
Start: 2019-07-16

## 2019-07-16 RX ORDER — FENTANYL CITRATE 50 UG/ML
25 INJECTION, SOLUTION INTRAMUSCULAR; INTRAVENOUS
Status: CANCELLED | OUTPATIENT
Start: 2019-07-16

## 2019-07-16 RX ORDER — MAGNESIUM SULFATE 100 %
4 CRYSTALS MISCELLANEOUS AS NEEDED
Status: CANCELLED | OUTPATIENT
Start: 2019-07-16

## 2019-07-16 RX ORDER — PROPOFOL 10 MG/ML
INJECTION, EMULSION INTRAVENOUS AS NEEDED
Status: DISCONTINUED | OUTPATIENT
Start: 2019-07-16 | End: 2019-07-16 | Stop reason: HOSPADM

## 2019-07-16 RX ORDER — DEXTROSE MONOHYDRATE 50 MG/ML
INJECTION, SOLUTION INTRAVENOUS
Status: DISCONTINUED | OUTPATIENT
Start: 2019-07-16 | End: 2019-07-16 | Stop reason: HOSPADM

## 2019-07-16 RX ORDER — LIDOCAINE HYDROCHLORIDE 20 MG/ML
INJECTION, SOLUTION EPIDURAL; INFILTRATION; INTRACAUDAL; PERINEURAL AS NEEDED
Status: DISCONTINUED | OUTPATIENT
Start: 2019-07-16 | End: 2019-07-16 | Stop reason: HOSPADM

## 2019-07-16 RX ORDER — SODIUM CHLORIDE, SODIUM LACTATE, POTASSIUM CHLORIDE, CALCIUM CHLORIDE 600; 310; 30; 20 MG/100ML; MG/100ML; MG/100ML; MG/100ML
50 INJECTION, SOLUTION INTRAVENOUS CONTINUOUS
Status: CANCELLED | OUTPATIENT
Start: 2019-07-16

## 2019-07-16 RX ORDER — INSULIN LISPRO 100 [IU]/ML
INJECTION, SOLUTION INTRAVENOUS; SUBCUTANEOUS ONCE
Status: CANCELLED | OUTPATIENT
Start: 2019-07-16 | End: 2019-07-17

## 2019-07-16 RX ORDER — SODIUM CHLORIDE, SODIUM LACTATE, POTASSIUM CHLORIDE, CALCIUM CHLORIDE 600; 310; 30; 20 MG/100ML; MG/100ML; MG/100ML; MG/100ML
125 INJECTION, SOLUTION INTRAVENOUS CONTINUOUS
Status: DISCONTINUED | OUTPATIENT
Start: 2019-07-16 | End: 2019-07-16 | Stop reason: HOSPADM

## 2019-07-16 RX ADMIN — LIDOCAINE HYDROCHLORIDE 80 MG: 20 INJECTION, SOLUTION EPIDURAL; INFILTRATION; INTRACAUDAL; PERINEURAL at 15:29

## 2019-07-16 RX ADMIN — PROPOFOL 150 MG: 10 INJECTION, EMULSION INTRAVENOUS at 15:29

## 2019-07-16 RX ADMIN — DEXTROSE MONOHYDRATE: 50 INJECTION, SOLUTION INTRAVENOUS at 15:22

## 2019-07-16 RX ADMIN — SODIUM CHLORIDE, SODIUM LACTATE, POTASSIUM CHLORIDE, AND CALCIUM CHLORIDE 125 ML/HR: 600; 310; 30; 20 INJECTION, SOLUTION INTRAVENOUS at 11:57

## 2019-07-16 RX ADMIN — PROPOFOL 50 MG: 10 INJECTION, EMULSION INTRAVENOUS at 15:35

## 2019-07-16 RX ADMIN — PROPOFOL 50 MG: 10 INJECTION, EMULSION INTRAVENOUS at 15:31

## 2019-07-16 RX ADMIN — PROPOFOL 100 MG: 10 INJECTION, EMULSION INTRAVENOUS at 15:30

## 2019-07-16 RX ADMIN — PROPOFOL 100 MG: 10 INJECTION, EMULSION INTRAVENOUS at 15:33

## 2019-07-16 RX ADMIN — PROPOFOL 100 MG: 10 INJECTION, EMULSION INTRAVENOUS at 15:32

## 2019-07-16 NOTE — PERIOP NOTES
Reviewed PTA medication list with patient/caregiver and patient/caregiver denies any additional medications. Patient admits to having a responsible adult care for them for at least 24 hours after surgery.   Urine preg negative and verified by Avril Canchola RN

## 2019-07-16 NOTE — PROCEDURES
Esophagogastroduodenoscopy Procedure Note    Indications: 51 y.o. obese female who is 5 years post gastric bypass with poor F/U. She recently presented to the office with C/O abd pain and dysphagia. An UGI was essentially normal as well as a subsequent CT scan. She understands the need to proceed with an EGD to evaluate for possible stricture secondary to ulcer formation    Anesthesia/Sedation: MAC anesthesia    Pre-Procedure Exam:  Airway: clear   Heart: normal S1and S2    Lungs: clear bilateral  Abdomen: soft, nontender, bowel sounds present and normal in all quadrants   Mental Status: awake, alert, and oriented to person, place, and time      Procedure in Detail:  Informed consent was obtained for the procedure, including conscious sedation. Risks of pancreatitis, infection, perforation, hemorrhage, adverse drug reaction, and aspiration were discussed. The patient was placed in the left lateral decubitus position. Based on the pre-procedure assessment, including review of the patient's medical history, medications, allergies, and review of systems, he had been deemed to be an appropriate candidate for moderate sedation; he was therefore sedated with the medications listed above. He was monitored continuously with electrocardiogram tracing, pulse oximetry, blood pressure monitoring, and direct observation. The [id number] gastroscope was inserted into the mouth and advanced under direct vision to the proximal cem limb. A careful inspection was made as the gastroscope was withdrawn, including a retroflexed view of the proximal stomach; findings and interventions are described below. Appropriate photodocumentation was obtained.     Findings:   Oropharynx - normal mucosa without abnormalities  Esophagus - normal mucosa without webs, rings, or strictures, no esophagitis or ulcers  Gastric pouch - mild to moderate gastritis, 2cm hiatal hernia but with normal size and configuration without evidence of ulcers Anastomosis - normal diameter for timeframe without ulcers or other anatomical abnormalities  Sandoval limb - normal mucosa without ulcers or obstruction    Therapies:    biopsy of stomach pouch    Specimens: Specimens were collected and sent to pathology. Complications:   None; patient tolerated the procedure well. EBL:  None           Attending Attestation:  I performed the procedure. Recommendations:  - Follow symptoms. Signed by:  Armin Ferguson MD

## 2019-07-16 NOTE — PERIOP NOTES
Patient complained of feeling nauseous and low blood sugar.  Blood sugar checked 82 d5 lr hung by crna per Dr. Prieto Sam.

## 2019-07-16 NOTE — INTERVAL H&P NOTE
H&P Update:  Darian Pardo was seen and examined. History and physical has been reviewed. The patient has been examined.  There have been no significant clinical changes since the completion of the originally dated History and Physical.

## 2019-07-16 NOTE — DISCHARGE INSTRUCTIONS
Darian Pardo  [id number]  1970    EGD DISCHARGE INSTRUCTIONS    Discomfort:  Sore throat- throat lozenges or warm salt water gargle  redness at IV site- apply warm compress to area; if redness or soreness persist- contact your physician  Gaseous discomfort- walking, belching will help relieve any discomfort  You should not operate a vehicle for 12 hours  You should note engage in an occupation involving machinery or appliances for rest of today  You may note drink alcoholic beverages for at least 12 hours  Avoid making any critical decisions for at least 24 hour  DIET  You may resume your regular diet - however -  remember your colon is empty and a heavy meal will produce gas. Avoid these foods:  vegetables, fried / greasy foods, carbonated drinks    ACTIVITY  You may resume your normal daily activities   Spend the remainder of the day resting -  avoid any strenuous activity. CALL M.D. ANY SIGN OF   Increasing pain, nausea, vomiting  Abdominal distension (swelling)  New increased bleeding (oral or rectal)  Fever (chills)  Pain in chest area  Bloody discharge from nose or mouth  Shortness of breath       Follow-up Instructions:   Dr Victorino Sewell will call you in one to two weeks. If you do not hear from him, please call his office [phone].                   Darian Pardo  [id number]  1970        DISCHARGE SUMMARY from Nurse    The following personal items collected during your admission are returned to you:   Dental Appliance: Dental Appliances: None  Vision: Visual Aid: Glasses  Hearing Aid:    Jewelry:    Clothing:    Other Valuables:    Valuables sent to safe:      PATIENT INSTRUCTIONS:    Take Home Medications:    Patient armband removed and shredded    DISCHARGE SUMMARY from Nurse    PATIENT INSTRUCTIONS:    After general anesthesia or intravenous sedation, for 24 hours or while taking prescription Narcotics:  · Limit your activities  · Do not drive and operate hazardous machinery  · Do not make important personal or business decisions  · Do  not drink alcoholic beverages  · If you have not urinated within 8 hours after discharge, please contact your surgeon on call. Report the following to your surgeon:  · Excessive pain, swelling, redness or odor of or around the surgical area  · Temperature over 100.5  · Nausea and vomiting lasting longer than 4 hours or if unable to take medications  · Any signs of decreased circulation or nerve impairment to extremity: change in color, persistent  numbness, tingling, coldness or increase pain  · Any questions    What to do at Home:  Recommended activity: Activity as tolerated and no driving for today and Ambulate in house,     If you experience any of the following symptoms above, please follow up with Dr. Jyotsna Porras. *  Please give a list of your current medications to your Primary Care Provider. *  Please update this list whenever your medications are discontinued, doses are      changed, or new medications (including over-the-counter products) are added. *  Please carry medication information at all times in case of emergency situations. These are general instructions for a healthy lifestyle:    No smoking/ No tobacco products/ Avoid exposure to second hand smoke  Surgeon General's Warning:  Quitting smoking now greatly reduces serious risk to your health. Obesity, smoking, and sedentary lifestyle greatly increases your risk for illness    A healthy diet, regular physical exercise & weight monitoring are important for maintaining a healthy lifestyle    You may be retaining fluid if you have a history of heart failure or if you experience any of the following symptoms:  Weight gain of 3 pounds or more overnight or 5 pounds in a week, increased swelling in our hands or feet or shortness of breath while lying flat in bed. Please call your doctor as soon as you notice any of these symptoms; do not wait until your next office visit.         The discharge information has been reviewed with the patient and caregiver. The patient and caregiver verbalized understanding. Discharge medications reviewed with the patient and caregiver and appropriate educational materials and side effects teaching were provided.   ___________________________________________________________________________________________________________________________________

## 2019-11-12 ENCOUNTER — OFFICE VISIT (OUTPATIENT)
Dept: ONCOLOGY | Age: 49
End: 2019-11-12

## 2019-11-12 VITALS
HEIGHT: 65 IN | SYSTOLIC BLOOD PRESSURE: 144 MMHG | HEART RATE: 77 BPM | BODY MASS INDEX: 38.32 KG/M2 | OXYGEN SATURATION: 99 % | WEIGHT: 230 LBS | DIASTOLIC BLOOD PRESSURE: 78 MMHG | TEMPERATURE: 98 F | RESPIRATION RATE: 18 BRPM

## 2019-11-12 DIAGNOSIS — Z80.3 FAMILY HISTORY OF BREAST CANCER: ICD-10-CM

## 2019-11-12 DIAGNOSIS — C54.1 ENDOMETRIAL CANCER (HCC): Primary | ICD-10-CM
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
## 2019-11-12 NOTE — PROGRESS NOTES
Elodia Gitelman is a 47 y.o. female presents in office for    Chief Complaint   Patient presents with    New Patient    Uterine Cancer        Do you have any unusual vaginal bleeding, discharge or irritation? no  Do you have any changes in your bowel movements? no  Have you been experiencing nausea or vomiting? Pt reports nausea last week after hysterectomy  Have you been experiencing any continuous or worsening abdominal pain? Pt c/o being sore due to hysterectomy  Any urinary burning? no    Visit Vitals  /78 (BP 1 Location: Left arm, BP Patient Position: Sitting)   Pulse 77   Temp 98 °F (36.7 °C) (Oral)   Resp 18   Ht 5' 5\" (1.651 m)   Wt 104.3 kg (230 lb)   SpO2 99%   BMI 38.27 kg/m²         Health Maintenance Due   Topic Date Due    PAP AKA CERVICAL CYTOLOGY  09/14/1990    Influenza Age 5 to Adult  08/01/2019    Shingrix Vaccine Age 50> (1 of 2) 09/14/2019    BREAST CANCER SCRN MAMMOGRAM  09/14/2019    FOBT Q 1 YEAR AGE 50-75  09/14/2019         1. Have you been to the ER, urgent care clinic since your last visit? Hospitalized since your last visit? [facility] 11/5/19-11/6/19 r/t total hysterectomy    2. Have you seen or consulted any other health care providers outside of the [address] [place] [address] since your last visit? Include any pap smears or colon screening. [facility]    3 most recent PHQ Screens 11/12/2019   Little interest or pleasure in doing things Not at all   Feeling down, depressed, irritable, or hopeless Not at all   Total Score PHQ 2 0       Abuse Screening Questionnaire 11/12/2019   Do you ever feel afraid of your partner? N   Are you in a relationship with someone who physically or mentally threatens you? N   Is it safe for you to go home? Y       Fall Risk Assessment, last 12 mths 11/12/2019   Able to walk? Yes   Fall in past 12 months?  No       Learning Assessment 5/1/2019   PRIMARY LEARNER Patient   PRIMARY LANGUAGE ENGLISH   LEARNER PREFERENCE PRIMARY DEMONSTRATION   ANSWERED BY patient   RELATIONSHIP SELF

## 2019-11-12 NOTE — LETTER
11/12/19 Patient: Gregor Bernabe YOB: 1970 Date of Visit: 11/12/2019 Pal Jackson MD 
[place] [address] VIA Facsimile: [phone] Dear Pal Jackson MD, [facility]. My notes for this consultation are attached. If you have questions, please do not hesitate to call me. I look forward to following your patient along with you. Sincerely, Myke Recio MD

## 2019-11-12 NOTE — PATIENT INSTRUCTIONS
Uterine Cancer: Care Instructions  Your Care Instructions  Uterine cancer is the rapid growth of abnormal cells that line the uterus. It also is called endometrial cancer. These cells may spread to nearby organs, lymph glands, or distant organs. Uterine cancer can be cured most often when found early. Treatment may include surgery to remove the uterus, ovaries, fallopian tubes, and sometimes the pelvic lymph nodes. Radiation and hormones to stop cancer growth also are sometimes used. Chemotherapy may be used if the cancer has spread. Having cancer can be scary. You may feel many emotions and may need some help coping. Seek out family, friends, and counselors for support. You can do things at home to make yourself feel better while you go through treatment. You also can call the [employer] (0-745.709.1957) or visit its website at [address] for more information. Follow-up care is a key part of your treatment and safety. Be sure to make and go to all appointments, and call your doctor if you are having problems. It's also a good idea to know your test results and keep a list of the medicines you take. How can you care for yourself at home? · Take your medicines exactly as prescribed. Call your doctor if you think you are having a problem with your medicine. You may get medicine for nausea and vomiting if you have these side effects. · Eat healthy food. If you do not feel like eating, try to eat food that has protein and extra calories to keep up your strength and prevent weight loss. Drink liquid meal replacements for extra calories and protein. Try to eat your main meal early. · Get some physical activity every day, but do not get too tired. Keep doing the hobbies you enjoy as your energy allows. · Take steps to control your stress and workload. Learn relaxation techniques. ? Share your feelings. Stress and tension affect our emotions.  By expressing your feelings to others, you may be able to understand and cope with them. ? Consider joining a support group. Talking about a problem with your spouse, a good friend, or other people with similar problems is a good way to reduce tension and stress. ? Express yourself through art. Try writing, dance, art, or crafts to relieve tension. Some dance, writing, or art groups may be available just for people who have cancer. ? Be kind to your body and mind. Getting enough sleep, eating a healthy diet, and taking time to do things you enjoy can contribute to an overall feeling of balance in your life and help reduce stress. ? Get help if you need it. Discuss your concerns with your doctor or counselor. · If you are vomiting or have diarrhea:  ? Drink plenty of fluids (enough so that your urine is light yellow or clear like water) to prevent dehydration. Choose water and other caffeine-free clear liquids. If you have kidney, heart, or liver disease and have to limit fluids, talk with your doctor before you increase the amount of fluids you drink. ? When you are able to eat, try clear soups, mild foods, and liquids until all symptoms are gone for 12 to 48 hours. Other good choices include dry toast, crackers, cooked cereal, and gelatin dessert, such as Jell-O.  · Take care of your urinary tract to prevent problems such as infection, which can be caused by uterine cancer and its treatment. Limit drinks with caffeine, drink plenty of fluids, and urinate every 3 to 4 hours. · If you have not already done so, prepare a list of advance directives. Advance directives are instructions to your doctor and family members about what kind of care you want if you become unable to speak or express yourself. When should you call for help? Call 911 anytime you think you may need emergency care.  For example, call if:    · You passed out (lost consciousness).    Call your doctor now or seek immediate medical care if:    · You have a fever.     · You have abnormal bleeding.     · You have new or worse pain.     · You think you have an infection.     · You have new symptoms, such as a cough, belly pain, vomiting, diarrhea, or a rash.    Watch closely for changes in your health, and be sure to contact your doctor if:    · You are much more tired than usual.     · You have swollen glands in your armpits, groin, or neck.     · You do not get better as expected. Where can you learn more? Go to http://manuel-sulma.info/. Enter E343 in the search box to learn more about \"Uterine Cancer: Care Instructions. \"  Current as of: December 19, 2018  Content Version: 12.2  © 4948-3024 [employer]. Care instructions adapted under license by [employer] (which disclaims liability or warranty for this information). If you have questions about a medical condition or this instruction, always ask your healthcare professional. [address] any warranty or liability for your use of this information.

## 2019-11-12 NOTE — PROGRESS NOTES
Genetic Testing Performed today. Discussed risk vs benefits of testing given diagnosis of endometrial cancer and family history. Informed consent obtain. Sample draw via venipuncture without complication. Specimen packaged and sent via [address] to [facility].        Ophelia Boykin NP

## 2019-11-20 ENCOUNTER — TELEPHONE (OUTPATIENT)
Dept: ONCOLOGY | Age: 49
End: 2019-11-20

## 2019-11-20 NOTE — TELEPHONE ENCOUNTER
Informed pt her [facility] testing was negative for all 23 genetic variations. Patient verbalized understanding. Patient instructed to contact office for any question or concerns.        Cristina Galvan NP

## 2020-02-21 ENCOUNTER — TELEPHONE (OUTPATIENT)
Dept: ONCOLOGY | Age: 50
End: 2020-02-21

## 2020-02-21 NOTE — TELEPHONE ENCOUNTER
Left voicemail on home and mobile numbers for patient to call to move appointment from 9 am to 8:45 am on 2/25/2020
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
## 2020-02-25 ENCOUNTER — OFFICE VISIT (OUTPATIENT)
Dept: ONCOLOGY | Age: 50
End: 2020-02-25

## 2020-02-25 VITALS
DIASTOLIC BLOOD PRESSURE: 80 MMHG | WEIGHT: 241.1 LBS | BODY MASS INDEX: 41.16 KG/M2 | RESPIRATION RATE: 16 BRPM | HEIGHT: 64 IN | TEMPERATURE: 98.5 F | HEART RATE: 84 BPM | SYSTOLIC BLOOD PRESSURE: 142 MMHG | OXYGEN SATURATION: 100 %

## 2020-02-25 DIAGNOSIS — N95.1 MENOPAUSAL VASOMOTOR SYNDROME: ICD-10-CM

## 2020-02-25 DIAGNOSIS — C54.1 ENDOMETRIAL CANCER (HCC): Primary | ICD-10-CM

## 2020-02-25 RX ORDER — VENLAFAXINE 37.5 MG/1
37.5 TABLET ORAL DAILY
Qty: 30 TAB | Refills: 3 | Status: SHIPPED | OUTPATIENT
Start: 2020-02-25 | End: 2021-07-07 | Stop reason: SDUPTHER

## 2020-02-25 NOTE — PROGRESS NOTES
Mahesh Farris; 47 y.o  female with Hx of Endometrial carcainoma.     () 1970     Last office visit: 2019    Subjective: Patient states has problems fluid retention. Patient states abdominal has spasm. Patient states has no pain but is tired at times. Patient had umbilical infection 9/3/1450 and attended [facility]. Is anyone accompanying the patient? No     Is the patient using a DME (wheelchair,cane or crutches)? No     Have you been to the ER, urgent care clinic since your last visit? No      Any irritation or itching? No     Any abnormal bleeding or discharge? No     Any abdominal pain or discomfort? No     Any pain or discomfort in urination? No     Patient currently attending office visit for 3 month surveillance. Patient has a Hx of Endometrial carcinoma. .  Visit Vitals  /80 (BP 1 Location: Right arm, BP Patient Position: Sitting)   Pulse 84   Temp 98.5 °F (36.9 °C) (Oral)   Resp 16   Ht 5' 4\" (1.626 m)   Wt 241 lb 1.6 oz (109.4 kg)   SpO2 100%   BMI 41.38 kg/m²         . Learning Assessment 2019   PRIMARY LEARNER Patient   PRIMARY LANGUAGE ENGLISH   LEARNER PREFERENCE PRIMARY DEMONSTRATION   ANSWERED BY patient   RELATIONSHIP SELF     .   3 most recent PHQ Screens 2019 2019 3/8/2017   Little interest or pleasure in doing things Not at all Not at all Not at all   Feeling down, depressed, irritable, or hopeless Not at all Not at all Not at all   Total Score PHQ 2 0 0 0

## 2020-02-25 NOTE — LETTER
2/25/20 Patient: Nico Canchola YOB: 1970 Date of Visit: 2/25/2020 Capo Menjivar MD 
[place] [address] VIA Facsimile: [phone] Dear Capo Menjivar MD, Thank you for referring Ms. Nico Canchola to [address] [place] [address] SPECIALIST AT [address] [place] [address] for evaluation. My notes for this consultation are attached. If you have questions, please do not hesitate to call me. I look forward to following your patient along with you. Sincerely, Fab Santamaria MD

## 2020-04-03 ENCOUNTER — DOCUMENTATION ONLY (OUTPATIENT)
Dept: SURGERY | Age: 50
End: 2020-04-03

## 2020-04-03 NOTE — PROGRESS NOTES
Per [facility] requirements;  E-mail and letter sent for follow up appointment. [employer] [facility] [place]  [employer] [facility]  [facility]      Dear Patient,    Your health is our main concern. It is important for your health to have follow-up lab work and to see you surgeon at 2 months, 4 months, 6 months, 9 months and annually after your weight loss surgery. Additionally, the Department of Bariatric Surgery at our hospital is a member of the [employer] [address] Surgical Quality Improvement Program ([facility] NSQIP). As a participant in this program, we gather information on the outcomes of our patients after surgery. Please call the office for a follow up appointment at [phone]. If you have moved out of the area or have changed surgeons please call us and let us know the name of your doctor. Your health and feedback are important to us. We greatly appreciate your response.        Thank you,  [facility] [address] [facility]

## 2020-04-03 NOTE — LETTER
[employer] [facility] 
 
 
Dear Patient, Your health is our main concern. It is important for your health to have follow-up lab work and to see you surgeon at 2 months, 4 months, 6 months, 9 months and annually after your weight loss surgery. Additionally, the Department of Bariatric Surgery at our hospital is a member of the [employer] [address] Surgical Quality Improvement Program ([facility] NSQIP). As a participant in this program, we gather information on the outcomes of our patients after surgery. Please call the office for a follow up appointment at [phone]. If you have moved out of the area or have changed surgeons please call us and let us know the name of your doctor. Your health and feedback are important to us. We greatly appreciate your response. Thank you, [facility]

## 2020-06-12 ENCOUNTER — OFFICE VISIT (OUTPATIENT)
Dept: ONCOLOGY | Age: 50
End: 2020-06-12

## 2020-06-12 VITALS
DIASTOLIC BLOOD PRESSURE: 63 MMHG | RESPIRATION RATE: 14 BRPM | HEIGHT: 64 IN | TEMPERATURE: 97.5 F | WEIGHT: 246.4 LBS | HEART RATE: 81 BPM | BODY MASS INDEX: 42.07 KG/M2 | SYSTOLIC BLOOD PRESSURE: 142 MMHG

## 2020-06-12 DIAGNOSIS — C54.1 ENDOMETRIAL CANCER (HCC): Primary | ICD-10-CM

## 2020-06-12 NOTE — PROGRESS NOTES
Lizbeth Aldridge is a 47 y.o. female presents in office for endometrial cancer surveillance. Chief Complaint   Patient presents with    Uterine Cancer     Endometrial cancer        Do you have any unusual vaginal bleeding, discharge or irritation? Pt c/o spotting when wiping and on underware 'a couple weeks ago'. Reports increase in standing and movement. Do you have any changes in your bowel movements? No  Have you been experiencing nausea or vomiting? No  Have you been experiencing any continuous or worsening abdominal pain? No  Any urinary burning? No    Visit Vitals  /63 (BP 1 Location: Left arm, BP Patient Position: Sitting)   Pulse 81   Temp 97.5 °F (36.4 °C) (Oral)   Resp 14   Ht 5' 4\" (1.626 m)   Wt 111.8 kg (246 lb 6.4 oz)   BMI 42.29 kg/m²         Health Maintenance Due   Topic Date Due    PAP AKA CERVICAL CYTOLOGY  09/14/1990    Shingrix Vaccine Age 50> (1 of 2) 09/14/2019    Breast Cancer Screen Mammogram  09/14/2019    FOBT Q1Y Age 50-75  09/14/2019         1. Have you been to the ER, urgent care clinic since your last visit? Hospitalized since your last visit? No    2. Have you seen or consulted any other health care providers outside of the [address] [place] [address] since your last visit? Include any pap smears or colon screening. No    3 most recent PHQ Screens 6/12/2020   Little interest or pleasure in doing things Not at all   Feeling down, depressed, irritable, or hopeless Not at all   Total Score PHQ 2 0       Abuse Screening Questionnaire 11/12/2019   Do you ever feel afraid of your partner? N   Are you in a relationship with someone who physically or mentally threatens you? N   Is it safe for you to go home? Y       Fall Risk Assessment, last 12 mths 11/12/2019   Able to walk? Yes   Fall in past 12 months?  No       Learning Assessment 5/1/2019   PRIMARY LEARNER Patient   PRIMARY LANGUAGE ENGLISH   LEARNER PREFERENCE PRIMARY DEMONSTRATION   ANSWERED BY patient   RELATIONSHIP SELF

## 2020-06-12 NOTE — LETTER
6/12/20 Patient: Ananth Coy YOB: 1970 Date of Visit: 6/12/2020 Kecia Barr MD 
[place] [address] VIA Facsimile: [phone] Dear Kecia Barr MD, Thank you for referring Ms. Ananth Coy to [facility] for evaluation. My notes for this consultation are attached. If you have questions, please do not hesitate to call me. I look forward to following your patient along with you. Sincerely, Jacob Thakur MD
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
## 2020-07-24 ENCOUNTER — PATIENT MESSAGE (OUTPATIENT)
Dept: ONCOLOGY | Age: 50
End: 2020-07-24

## 2020-07-27 NOTE — TELEPHONE ENCOUNTER
Called patient and explained based on her symptoms I would recommend she speak with her PCP first. Explained symptoms of recurrence of endometrial cancer with patient. Patient verbalized understanding and agreed to plan. Patient instructed to contact office for any question or concerns.      Ophelia Boykin NP

## 2021-03-16 ENCOUNTER — DOCUMENTATION ONLY (OUTPATIENT)
Dept: SURGERY | Age: 51
End: 2021-03-16

## 2021-03-16 NOTE — PROGRESS NOTES
Per [facility] requirements;  E-mail and letter sent for follow up appointment. [facility]  [address] [place], [address] [facility] [address]                 [facility]  Formerly [facility]  [facility]      Dear Patient,    Your health is our main concern. It is important for your health to have follow-up lab work and to see your surgeon at 2 months, 4 months, 6 months, 9 months and annually after your weight loss surgery. Additionally, the Department of Bariatric Surgery at our hospital is a member of the [employer] of [address] [place] [address] Surgical Quality Improvement Program ([facility] NSQIP). As a participant in this program, we gather information on the outcomes of our patients after surgery. Please call the office for a follow up appointment at [phone]. If you have moved out of the area or have changed surgeons please call us and let us know the name of your doctor. Your health and feedback are important to us. We greatly appreciate your response.        Thank you,  [facility]

## 2021-07-07 ENCOUNTER — OFFICE VISIT (OUTPATIENT)
Dept: ONCOLOGY | Age: 51
End: 2021-07-07
Payer: COMMERCIAL

## 2021-07-07 VITALS
HEIGHT: 64 IN | TEMPERATURE: 97 F | OXYGEN SATURATION: 96 % | HEART RATE: 99 BPM | BODY MASS INDEX: 35.27 KG/M2 | WEIGHT: 206.6 LBS | SYSTOLIC BLOOD PRESSURE: 121 MMHG | DIASTOLIC BLOOD PRESSURE: 80 MMHG

## 2021-07-07 DIAGNOSIS — C54.1 ENDOMETRIAL CANCER (HCC): ICD-10-CM

## 2021-07-07 DIAGNOSIS — N95.1 MENOPAUSAL VASOMOTOR SYNDROME: Primary | ICD-10-CM

## 2021-07-07 PROCEDURE — 99213 OFFICE O/P EST LOW 20 MIN: CPT | Performed by: OBSTETRICS & GYNECOLOGY

## 2021-07-07 RX ORDER — VENLAFAXINE 37.5 MG/1
37.5 TABLET ORAL DAILY
Qty: 30 TABLET | Refills: 3 | Status: SHIPPED | OUTPATIENT
Start: 2021-07-07 | End: 2021-11-15

## 2021-07-07 NOTE — LETTER
7/7/2021    Patient: Roly Guzman   YOB: 1970   Date of Visit: 7/7/2021     Rashawn Hoover MD  [place] [address]  Via Fax: [phone]    Dear Rashawn Hoover MD,      Thank you for referring Ms. Roly Guzman to [facility] for evaluation. My notes for this consultation are attached. If you have questions, please do not hesitate to call me. I look forward to following your patient along with you.       Sincerely,    Isai Washington MD

## 2021-07-07 NOTE — PROGRESS NOTES
Tiffanie Basurto is a 45 y.o. female presents in office for surveillance endometrial cancer. Chief Complaint   Patient presents with    Follow-up      Pt needs refill on Effexor   Do you have any unusual vaginal bleeding, discharge or irritation? Pt c/o occasional spotting   Do you have any changes in your bowel movements? no  Have you been experiencing nausea or vomiting? no  Have you been experiencing any continuous or worsening abdominal pain? no  Any urinary burning? no    Visit Vitals  /80 (BP 1 Location: Left upper arm, BP Patient Position: Sitting, BP Cuff Size: Large adult)   Pulse 99   Temp 97 °F (36.1 °C) (Oral)   Ht 5' 4\" (1.626 m)   Wt 206 lb 9.6 oz (93.7 kg)   SpO2 96%   BMI 35.46 kg/m²         1. Have you been to the ER, urgent care clinic since your last visit? Hospitalized since your last visit? No    2. Have you seen or consulted any other health care providers outside of the [address] [place] [address] since your last visit? Include any pap smears or colon screening. No    3 most recent PHQ Screens 7/7/2021   Little interest or pleasure in doing things Not at all   Feeling down, depressed, irritable, or hopeless Not at all   Total Score PHQ 2 0       Abuse Screening Questionnaire 11/12/2019   Do you ever feel afraid of your partner? N   Are you in a relationship with someone who physically or mentally threatens you? N   Is it safe for you to go home? Y       Fall Risk Assessment, last 12 mths 11/12/2019   Able to walk? Yes   Fall in past 12 months?  No       Learning Assessment 5/1/2019   PRIMARY LEARNER Patient   PRIMARY LANGUAGE ENGLISH   LEARNER PREFERENCE PRIMARY DEMONSTRATION   ANSWERED BY patient   RELATIONSHIP SELF
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
## 2021-11-15 RX ORDER — VENLAFAXINE 37.5 MG/1
37.5 TABLET ORAL DAILY
Qty: 30 TABLET | Refills: 3 | Status: SHIPPED | OUTPATIENT
Start: 2021-11-15

## (undated) DEVICE — ALL PURPOSE SPONGES,NON-WOVEN, 4 PLY: Brand: CURITY

## (undated) DEVICE — SYR 3ML LL TIP 1/10ML GRAD --

## (undated) DEVICE — 4-PORT MANIFOLD: Brand: NEPTUNE 2

## (undated) DEVICE — AIRLIFE™ NASAL OXYGEN CANNULA CURVED, FLARED TIP WITH 14 FOOT (4.3 M) CRUSH-RESISTANT TUBING, OVER-THE-EAR STYLE: Brand: AIRLIFE™

## (undated) DEVICE — ENDO CARRY-ON PROCEDURE KIT INCLUDES ENZYMATIC SPONGE, GAUZE, BIOHAZARD LABEL, TRAY, LUBRICANT, DIRTY SCOPE LABEL, WATER LABEL, TRAY, DRAWSTRING PAD, AND DEFENDO 4-PIECE KIT.: Brand: ENDO CARRY-ON PROCEDURE KIT

## (undated) DEVICE — GOWN ISOLATN REG BLU POLY UNISX W/ THMB LOOP

## (undated) DEVICE — TRNQT TEXT 1X18IN BLU LF DISP -- CONVERT TO ITEM 362165

## (undated) DEVICE — KENDALL RADIOLUCENT FOAM MONITORING ELECTRODE RECTANGULAR SHAPE: Brand: KENDALL

## (undated) DEVICE — STERILE POLYISOPRENE POWDER-FREE SURGICAL GLOVES: Brand: PROTEXIS

## (undated) DEVICE — TRAP SPEC COLL POLYP POLYSTYR --

## (undated) DEVICE — SET ADMIN 16ML TBNG L100IN 2 Y INJ SITE IV PIGGY BK DISP

## (undated) DEVICE — GARMENT,MEDLINE,DVT,INT,CALF,MED, GEN2: Brand: MEDLINE

## (undated) DEVICE — CATH IV SAFE STR 22GX1IN BLU -- PROTECTIV PLUS

## (undated) DEVICE — SYR 50ML SLIP TIP NSAF LF STRL --

## (undated) DEVICE — MAJ-1414 SINGLE USE ADPATER BIOPSY VALV: Brand: SINGLE USE ADAPTOR BIOPSY VALVE

## (undated) DEVICE — SINGLE PORT MANIFOLD: Brand: NEPTUNE 2

## (undated) DEVICE — BRUSH CYTO L240CM DIA2.3MM GI COLONOSCOPY 3 RNG HNDL RADPQ

## (undated) DEVICE — FORCEPS BX CAP 240CM L RAD JAW 4

## (undated) DEVICE — MOUTHPIECE ENDOSCP 20X27MM --

## (undated) DEVICE — STRAP,POSITIONING,KNEE/BODY,FOAM,4X60": Brand: MEDLINE

## (undated) DEVICE — NDL PRT INJ NSAF BLNT 18GX1.5 --

## (undated) DEVICE — EJECTOR SALIVA 6 IN FLX CLR

## (undated) DEVICE — Device